# Patient Record
(demographics unavailable — no encounter records)

---

## 2025-01-06 NOTE — HISTORY OF PRESENT ILLNESS
[de-identified] : Mandarin  [id number] (Marzhi)  Patient is a 73 year old male who underwent robotic-assisted laparoscopic right inguinal hernia repair with mesh on 12/17/2024 who presents for postoperative follow up. States that he feels well, denies fever/chills, some discomfort in right groin region which he states has been improving since surgery. Normal urinary/bowel habits. Has some itching at the incision sites. No other complaints at this time.  Pathology reveals: Mature adipose tissue consistent with lipoma, which was reviewed with the patient  Examination reveals well-healing incisions, no incisional hernia, non-TTP in all quadrants, no rebound/guarding. Testes in normal anatomic position, no recurrence of right inguinal hernia, no seroma/masses palpable  Patient is healing well, advised to keep incision sites clean, and to slowly resume normal activity. Patient was agreeable with this plan and all his questions were answered to his apparent satisfaction. Return PRN.